# Patient Record
Sex: MALE | Race: WHITE
[De-identification: names, ages, dates, MRNs, and addresses within clinical notes are randomized per-mention and may not be internally consistent; named-entity substitution may affect disease eponyms.]

---

## 2024-07-03 PROBLEM — Z00.00 ENCOUNTER FOR PREVENTIVE HEALTH EXAMINATION: Status: ACTIVE | Noted: 2024-07-03

## 2024-07-08 ENCOUNTER — APPOINTMENT (OUTPATIENT)
Dept: ORTHOPEDIC SURGERY | Facility: CLINIC | Age: 29
End: 2024-07-08
Payer: COMMERCIAL

## 2024-07-08 VITALS — BODY MASS INDEX: 27.4 KG/M2 | WEIGHT: 225 LBS | HEIGHT: 76 IN

## 2024-07-08 DIAGNOSIS — Z78.9 OTHER SPECIFIED HEALTH STATUS: ICD-10-CM

## 2024-07-08 DIAGNOSIS — S53.401A UNSPECIFIED SPRAIN OF RIGHT ELBOW, INITIAL ENCOUNTER: ICD-10-CM

## 2024-07-08 DIAGNOSIS — G56.21 LESION OF ULNAR NERVE, RIGHT UPPER LIMB: ICD-10-CM

## 2024-07-08 PROCEDURE — 99203 OFFICE O/P NEW LOW 30 MIN: CPT

## 2024-07-12 ENCOUNTER — APPOINTMENT (OUTPATIENT)
Dept: MRI IMAGING | Facility: CLINIC | Age: 29
End: 2024-07-12
Payer: COMMERCIAL

## 2024-07-12 PROCEDURE — 73221 MRI JOINT UPR EXTREM W/O DYE: CPT | Mod: RT

## 2024-07-18 ENCOUNTER — APPOINTMENT (OUTPATIENT)
Dept: NEUROLOGY | Facility: CLINIC | Age: 29
End: 2024-07-18
Payer: COMMERCIAL

## 2024-07-18 PROCEDURE — 95886 MUSC TEST DONE W/N TEST COMP: CPT

## 2024-07-18 PROCEDURE — 95911 NRV CNDJ TEST 9-10 STUDIES: CPT

## 2024-07-25 ENCOUNTER — APPOINTMENT (OUTPATIENT)
Dept: ORTHOPEDIC SURGERY | Facility: CLINIC | Age: 29
End: 2024-07-25
Payer: COMMERCIAL

## 2024-07-25 VITALS — HEIGHT: 76 IN | BODY MASS INDEX: 27.4 KG/M2 | WEIGHT: 225 LBS

## 2024-07-25 PROBLEM — M24.021 LOOSE BODY OF RIGHT ELBOW: Status: ACTIVE | Noted: 2024-07-25

## 2024-07-25 PROCEDURE — 99213 OFFICE O/P EST LOW 20 MIN: CPT

## 2024-07-25 NOTE — HISTORY OF PRESENT ILLNESS
[5] : 5 [Dull/Aching] : dull/aching [de-identified] : R elbow feels better  Still has stiffness in ext  I independently reviewed the EMG and my interpretation is there is slowing of latencies mildly at cubital tunnel  I independently reviewed the MRI and my interpretation is elbow effusion, loose bodies [FreeTextEntry1] : R elbow / b/l hands

## 2024-07-25 NOTE — PHYSICAL EXAM
[de-identified] : R elbow: Tender at the ulna nerve Pain with flexion +tinels at the ulna nerve +hyperflexion test Crepitus at the ulna nerve  R hand: Intrinsic weakness Decreased sensation in the ulna nerve distribution   Xray report neg

## 2024-07-25 NOTE — ASSESSMENT
[FreeTextEntry1] : I recommend following up with Dr. Jernigan - consider right elbow arthroscopy and removal of loose bodies Discussed ulna nerve which is improving Possible cortisone injection for elbow inflammation, likely due to loose bodies - will discuss surgery with Dr. Jernigan.  Return prn

## 2024-07-29 ENCOUNTER — APPOINTMENT (OUTPATIENT)
Dept: ORTHOPEDIC SURGERY | Facility: CLINIC | Age: 29
End: 2024-07-29
Payer: COMMERCIAL

## 2024-07-29 VITALS — BODY MASS INDEX: 27.28 KG/M2 | WEIGHT: 224 LBS | HEIGHT: 76 IN

## 2024-07-29 DIAGNOSIS — G56.21 LESION OF ULNAR NERVE, RIGHT UPPER LIMB: ICD-10-CM

## 2024-07-29 DIAGNOSIS — M24.021 LOOSE BODY IN RIGHT ELBOW: ICD-10-CM

## 2024-07-29 DIAGNOSIS — Z83.3 FAMILY HISTORY OF DIABETES MELLITUS: ICD-10-CM

## 2024-07-29 DIAGNOSIS — Z78.9 OTHER SPECIFIED HEALTH STATUS: ICD-10-CM

## 2024-07-29 PROCEDURE — 73080 X-RAY EXAM OF ELBOW: CPT | Mod: RT

## 2024-07-29 PROCEDURE — 99204 OFFICE O/P NEW MOD 45 MIN: CPT

## 2024-07-29 NOTE — DATA REVIEWED
[MRI] : MRI [Elbow] : elbow [Report was reviewed and noted in the chart] : The report was reviewed and noted in the chart [I independently reviewed and interpreted images and report] : I independently reviewed and interpreted images and report [I reviewed the films/CD and additionally noted] : I reviewed the films/CD and additionally noted [EMG Nerve Conduction] : A EMG Nerve Conduction test was completed of the [Right] : right [Upper extremity] : upper extremity [FreeTextEntry1] : MRI right elbow reveals evidence of cartilaginous loose body about olecranon fossa.

## 2024-07-29 NOTE — HISTORY OF PRESENT ILLNESS
[de-identified] : The patient is a 28 year  old R hand dominant male who presents today complaining of R elbow pain.   Date of Injury/Onset: 6/29/24 Pain:    At Rest: 0/10  With Activity:  2/10  Mechanism of injury: while doing a weighted tricep pull down on the cable machine felt a weirdness/pulling on extension This is NOT a Work Related Injury being treated under Worker's Compensation. This is NOT an athletic injury occurring associated with an interscholastic or organized sports team. Quality of symptoms: limited elbow extension, soreness, dull Improves with: rest Worse with: terminal flexion/extension Prior treatment: Dr. 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

## 2024-07-29 NOTE — HISTORY OF PRESENT ILLNESS
[de-identified] : The patient is a 28 year  old R hand dominant male who presents today complaining of R elbow pain.   Date of Injury/Onset: 6/29/24 Pain:    At Rest: 0/10  With Activity:  2/10  Mechanism of injury: while doing a weighted tricep pull down on the cable machine felt a weirdness/pulling on extension This is NOT a Work Related Injury being treated under Worker's Compensation. This is NOT an athletic injury occurring associated with an interscholastic or organized sports team. Quality of symptoms: limited elbow extension, soreness, dull Improves with: rest Worse with: terminal flexion/extension Prior treatment: Dr. 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

## 2024-07-29 NOTE — IMAGING
[Right] : right elbow [de-identified] : The patient is a well appearing 28 year old male of their stated age. Neck is supple & nontender to palpation. Negative Spurling's test.   Right Shoulder:   ROM: Forward Flexion: 180 degrees Abduction: 180 degrees ER at 90: 90 degrees IR at 90: 45 degrees ER at N: 50 degrees Motor: Abduction: 5 out of 5 FPS: 5 out of 5 Flexion: 5 out of 5 Internal Rotation: 5 out of 5 External Rotation: 5 out of 5 Provocative Testing: Impingement: Negative Litchfield's: Negative Other: N/A  ----------------------------------- Effected Elbow: RIGHT ROM: Flexion: 5-145 degrees Supination: 90 degrees Pronation: 90 degrees   Inspection: Erythema: None Ecchymosis: None Abrasions: None Effusion: None Deformity: None   Palpation: Crepitus: None Medial Epicondyle/Flexor-Pronator: Nontender Lateral Epicondyle/ECRB: Nontender Olecranon: Nontender Radial Head: Nontender Humeral Head: Nontender Distal Biceps: Nontender Distal Triceps: Nontender Flexor-Pronator: Nontender Extensor/ECRB: Nontender UCL: Nontender Pronator Intersection: Nontender Ulnar Nerve:  UNSTABLE & Nontender   Stress Testing: Varus at 0 Degrees: Stable Varus at 30 Degrees: Stable Valgus at 0 Degrees: Stable Valgus at 30 Degrees: Stable   Motor: Elbow Flexion: 5 out of 5 Elbow Extension: 5 out of 5 Supination: 5 out of 5 Pronation: 5 out of 5 Wrist Flexion: 5 out of 5 Wrist Extension: 5 out of 5 Interossei: 5 out of 5 : 5 out of 5   Provocative Testing: Milking: Negative Moving Valgus Stress: Negative Posterolateral R-I: Negative Hook Test: Negative Resisted Wrist Extension: No pain Resisted Index Finger Extension: No Pain Resisted Middle Finger Extension: No Pain Resisted Wrist Flexion: No Pain Resisted Pronation: No Pain   Neurologic Exam: Axillary Nerve:  SLT Radial Nerve: SLT Median Nerve: SLT Ulnar Nerve:  SLT Other:  N/A Vascular Exam: Radial Pulse: 2+ Ulnar Pulse: 2+ Capillary Refill: <2 Seconds Other Exams: None Pertinent Contralateral Elbow Findings: -5-150   Assessment: The patient is a 28 year old man with right elbow pain and radiographic and physical exam findings consistent with ulnar neuritis and loose bodies.  The patients condition is acute Documents/Results Reviewed Today: MRI right elbow, X-Ray right elbow and EMG/NVC RUE Tests/Studies Independently Interpreted Today: MRI right elbow reveals evidence of cartilaginous loose body about olecranon fossa. X-Ray right elbow reveals evidence of small loose body about olecranon fossa, possible multiple loose bodies about elbow joint. EMG/NVC RUE reveals evidence of mild cubital tunnel syndrome.  Pertinent findings include: LEFT ELBOW: -5-150, 90/90. RIGHT ELBOW: 5-145, 90/90, unstable ulnar nerve Confounding medical conditions/concerns: None   Plan: We discussed treatment options both operative vs non-operative for the patients ulnar neuritis and loose bodies considering mild carpal tunnel. Considering his elbow mechanical symptoms and a lack of range of motion, the patient decides to proceed with an ulnar nerve transposition and excision of loose bodies. The patient will follow up with Dr. Pratt for further evaluation and discussion of surgical planning. The patient will follow up on a PRN basis unless new symptoms arise.  Tests Ordered: None Prescription Medications Ordered: Discussed appropriate use of OTC anti-inflammatories and analgesics (including but not limited to Aleve, Advil, Tylenol, Motrin, Ibuprofen, Voltaren gel, etc.) Braces/DME Ordered: None Activity/Work/Sports Status: As tolerated  Additional Instructions: None Follow-Up: With Dr. Pratt for surgical planning  The patient's current medication management of their orthopedic diagnosis was reviewed today: (1) We discussed a comprehensive treatment plan that included possible pharmaceutical management involving the use of prescription strength medications including but not limited to options such as oral Naprosyn 500mg BID, once daily Meloxicam 15 mg, or 500-650 mg Tylenol versus over the counter oral medications and topical prescription NSAID Pennsaid vs over the counter Voltaren gel.  Based on our extensive discussion, the patient declined prescription medication and will use over the counter Advil, Alleve, Voltaren Gel or Tylenol as directed. (2) There is a moderate risk of morbidity with further treatment, especially from use of prescription strength medications and possible side effects of these medications which include upset stomach with oral medications, skin reactions to topical medications and cardiac/renal issues with long term use. (3) I recommended that the patient follow-up with their medical physician to discuss any significant specific potential issues with long term medication use such as interactions with current medications or with exacerbation of underlying medical comorbidities. (4) The benefits and risks associated with use of injectable, oral or topical, prescription and over the counter anti-inflammatory medications were discussed with the patient. The patient voiced understanding of the risks including but not limited to bleeding, stroke, kidney dysfunction, heart disease, and were referred to the black box warning label for further information.   IFransisca attest that this documentation has been prepared under the direction and in the presence of Provider Dr. Sincere Jernigan.   The documentation recorded by the scribe accurately reflects the services IDr. Sincere, personally performed and the decisions made by me. [FreeTextEntry1] : X-Ray right elbow reveals evidence of small loose body about olecranon fossa, possible multiple loose bodies about elbow joint.

## 2024-07-29 NOTE — IMAGING
[Right] : right elbow [de-identified] : The patient is a well appearing 28 year old male of their stated age. Neck is supple & nontender to palpation. Negative Spurling's test.   Right Shoulder:   ROM: Forward Flexion: 180 degrees Abduction: 180 degrees ER at 90: 90 degrees IR at 90: 45 degrees ER at N: 50 degrees Motor: Abduction: 5 out of 5 FPS: 5 out of 5 Flexion: 5 out of 5 Internal Rotation: 5 out of 5 External Rotation: 5 out of 5 Provocative Testing: Impingement: Negative Darling's: Negative Other: N/A  ----------------------------------- Effected Elbow: RIGHT ROM: Flexion: 5-145 degrees Supination: 90 degrees Pronation: 90 degrees   Inspection: Erythema: None Ecchymosis: None Abrasions: None Effusion: None Deformity: None   Palpation: Crepitus: None Medial Epicondyle/Flexor-Pronator: Nontender Lateral Epicondyle/ECRB: Nontender Olecranon: Nontender Radial Head: Nontender Humeral Head: Nontender Distal Biceps: Nontender Distal Triceps: Nontender Flexor-Pronator: Nontender Extensor/ECRB: Nontender UCL: Nontender Pronator Intersection: Nontender Ulnar Nerve:  UNSTABLE & Nontender   Stress Testing: Varus at 0 Degrees: Stable Varus at 30 Degrees: Stable Valgus at 0 Degrees: Stable Valgus at 30 Degrees: Stable   Motor: Elbow Flexion: 5 out of 5 Elbow Extension: 5 out of 5 Supination: 5 out of 5 Pronation: 5 out of 5 Wrist Flexion: 5 out of 5 Wrist Extension: 5 out of 5 Interossei: 5 out of 5 : 5 out of 5   Provocative Testing: Milking: Negative Moving Valgus Stress: Negative Posterolateral R-I: Negative Hook Test: Negative Resisted Wrist Extension: No pain Resisted Index Finger Extension: No Pain Resisted Middle Finger Extension: No Pain Resisted Wrist Flexion: No Pain Resisted Pronation: No Pain   Neurologic Exam: Axillary Nerve:  SLT Radial Nerve: SLT Median Nerve: SLT Ulnar Nerve:  SLT Other:  N/A Vascular Exam: Radial Pulse: 2+ Ulnar Pulse: 2+ Capillary Refill: <2 Seconds Other Exams: None Pertinent Contralateral Elbow Findings: -5-150   Assessment: The patient is a 28 year old man with right elbow pain and radiographic and physical exam findings consistent with ulnar neuritis and loose bodies.  The patients condition is acute Documents/Results Reviewed Today: MRI right elbow, X-Ray right elbow and EMG/NVC RUE Tests/Studies Independently Interpreted Today: MRI right elbow reveals evidence of cartilaginous loose body about olecranon fossa. X-Ray right elbow reveals evidence of small loose body about olecranon fossa, possible multiple loose bodies about elbow joint. EMG/NVC RUE reveals evidence of mild cubital tunnel syndrome.  Pertinent findings include: LEFT ELBOW: -5-150, 90/90. RIGHT ELBOW: 5-145, 90/90, unstable ulnar nerve Confounding medical conditions/concerns: None   Plan: We discussed treatment options both operative vs non-operative for the patients ulnar neuritis and loose bodies considering mild carpal tunnel. Considering his elbow mechanical symptoms and a lack of range of motion, the patient decides to proceed with an ulnar nerve transposition and excision of loose bodies. The patient will follow up with Dr. Pratt for further evaluation and discussion of surgical planning. The patient will follow up on a PRN basis unless new symptoms arise.  Tests Ordered: None Prescription Medications Ordered: Discussed appropriate use of OTC anti-inflammatories and analgesics (including but not limited to Aleve, Advil, Tylenol, Motrin, Ibuprofen, Voltaren gel, etc.) Braces/DME Ordered: None Activity/Work/Sports Status: As tolerated  Additional Instructions: None Follow-Up: With Dr. Pratt for surgical planning  The patient's current medication management of their orthopedic diagnosis was reviewed today: (1) We discussed a comprehensive treatment plan that included possible pharmaceutical management involving the use of prescription strength medications including but not limited to options such as oral Naprosyn 500mg BID, once daily Meloxicam 15 mg, or 500-650 mg Tylenol versus over the counter oral medications and topical prescription NSAID Pennsaid vs over the counter Voltaren gel.  Based on our extensive discussion, the patient declined prescription medication and will use over the counter Advil, Alleve, Voltaren Gel or Tylenol as directed. (2) There is a moderate risk of morbidity with further treatment, especially from use of prescription strength medications and possible side effects of these medications which include upset stomach with oral medications, skin reactions to topical medications and cardiac/renal issues with long term use. (3) I recommended that the patient follow-up with their medical physician to discuss any significant specific potential issues with long term medication use such as interactions with current medications or with exacerbation of underlying medical comorbidities. (4) The benefits and risks associated with use of injectable, oral or topical, prescription and over the counter anti-inflammatory medications were discussed with the patient. The patient voiced understanding of the risks including but not limited to bleeding, stroke, kidney dysfunction, heart disease, and were referred to the black box warning label for further information.   IFransisca attest that this documentation has been prepared under the direction and in the presence of Provider Dr. Sincere Jernigan.   The documentation recorded by the scribe accurately reflects the services IDr. Sincere, personally performed and the decisions made by me. [FreeTextEntry1] : X-Ray right elbow reveals evidence of small loose body about olecranon fossa, possible multiple loose bodies about elbow joint.